# Patient Record
Sex: MALE | Race: WHITE | Employment: FULL TIME | URBAN - NONMETROPOLITAN AREA
[De-identification: names, ages, dates, MRNs, and addresses within clinical notes are randomized per-mention and may not be internally consistent; named-entity substitution may affect disease eponyms.]

---

## 2024-03-22 PROBLEM — I10 BENIGN ESSENTIAL HYPERTENSION: Status: ACTIVE | Noted: 2024-03-22

## 2024-03-22 PROBLEM — E78.5 HYPERLIPIDEMIA: Status: ACTIVE | Noted: 2024-03-22

## 2024-03-22 PROBLEM — E05.90 HYPERTHYROIDISM: Status: ACTIVE | Noted: 2024-03-22

## 2024-03-22 PROBLEM — R06.09 DYSPNEA ON EXERTION: Status: ACTIVE | Noted: 2024-03-22

## 2024-03-22 PROBLEM — E11.9 DIABETES (MULTI): Status: ACTIVE | Noted: 2024-03-22

## 2024-03-22 RX ORDER — ROSUVASTATIN CALCIUM 10 MG/1
10 TABLET, COATED ORAL EVERY OTHER DAY
COMMUNITY
Start: 2013-01-02

## 2024-03-22 RX ORDER — AMLODIPINE BESYLATE 5 MG/1
5 TABLET ORAL DAILY
COMMUNITY
Start: 2022-10-27

## 2024-03-22 RX ORDER — DICLOFENAC SODIUM 75 MG/1
75 TABLET, DELAYED RELEASE ORAL 2 TIMES DAILY
COMMUNITY
Start: 2022-10-03

## 2024-03-22 RX ORDER — IRBESARTAN AND HYDROCHLOROTHIAZIDE 300; 12.5 MG/1; MG/1
1 TABLET, FILM COATED ORAL DAILY
COMMUNITY
Start: 2022-03-19

## 2024-03-22 RX ORDER — METHIMAZOLE 10 MG/1
10 TABLET ORAL DAILY
COMMUNITY
Start: 2014-10-31

## 2024-03-22 RX ORDER — METFORMIN HYDROCHLORIDE 500 MG/1
500 TABLET ORAL EVERY 12 HOURS
COMMUNITY
Start: 2022-03-10

## 2024-03-22 RX ORDER — METOPROLOL SUCCINATE 50 MG/1
50 TABLET, EXTENDED RELEASE ORAL DAILY
COMMUNITY
Start: 2014-11-20

## 2024-09-13 ENCOUNTER — OFFICE VISIT (OUTPATIENT)
Dept: CARDIOLOGY | Facility: CLINIC | Age: 64
End: 2024-09-13
Payer: COMMERCIAL

## 2024-09-13 VITALS
SYSTOLIC BLOOD PRESSURE: 136 MMHG | WEIGHT: 176 LBS | BODY MASS INDEX: 28.28 KG/M2 | DIASTOLIC BLOOD PRESSURE: 86 MMHG | HEART RATE: 68 BPM | HEIGHT: 66 IN

## 2024-09-13 DIAGNOSIS — I10 BENIGN ESSENTIAL HYPERTENSION: ICD-10-CM

## 2024-09-13 DIAGNOSIS — R93.1 ELEVATED CORONARY ARTERY CALCIUM SCORE: ICD-10-CM

## 2024-09-13 DIAGNOSIS — E78.5 HYPERLIPIDEMIA, UNSPECIFIED HYPERLIPIDEMIA TYPE: ICD-10-CM

## 2024-09-13 PROCEDURE — 1036F TOBACCO NON-USER: CPT | Performed by: INTERNAL MEDICINE

## 2024-09-13 PROCEDURE — 3079F DIAST BP 80-89 MM HG: CPT | Performed by: INTERNAL MEDICINE

## 2024-09-13 PROCEDURE — 3075F SYST BP GE 130 - 139MM HG: CPT | Performed by: INTERNAL MEDICINE

## 2024-09-13 PROCEDURE — 99214 OFFICE O/P EST MOD 30 MIN: CPT | Performed by: INTERNAL MEDICINE

## 2024-09-13 PROCEDURE — 3008F BODY MASS INDEX DOCD: CPT | Performed by: INTERNAL MEDICINE

## 2024-09-13 RX ORDER — OXYCODONE AND ACETAMINOPHEN 10; 325 MG/1; MG/1
1 TABLET ORAL EVERY 4 HOURS PRN
COMMUNITY
Start: 2023-07-05

## 2024-09-13 RX ORDER — ASPIRIN 81 MG/1
81 TABLET ORAL DAILY
Start: 2024-09-13 | End: 2025-09-13

## 2024-09-13 NOTE — LETTER
September 13, 2024     Jaziel Owen DO  [address] [facility] [address]    Patient: Judson Jo Jr.   YOB: 1960   Date of Visit: 9/13/2024       Dear Dr. Jaziel Owen DO:    Thank you for referring Judson Jo to me for evaluation. Below are my notes for this consultation.  If you have questions, please do not hesitate to call me. I look forward to following your patient along with you.       Sincerely,     Iftikhar Jha MD      CC: No Recipients  ______________________________________________________________________________________    Subjective   Judson Jo Jr. is a 64 y.o. male       Chief Complaint    Follow-up          HPI   Patient is in the office for follow-up for the problems noted below.  Since he was last seen in the office 2 years ago he underwent treadmill stress test which came back normal.  Also had coronary calcium score which came back at 449.  The patient has made significant lifestyle changes and a positive weight and lost nearly 50 pounds and maintains very active lifestyle.  At 1 point in time he was supposed to be taking Crestor 10 mg daily but apparently that was discontinued for unknown reason maybe by his PCP.  He denies any symptoms of chest pain or palpitations and plays golf on regular basis.  He spent the winter in [place].  The findings of the coronary calcium score were reviewed with the patient.  He is currently not taking aspirin and was encouraged to take a baby aspirin daily.    Assessment/recommendations:     1-elevated coronary calcium score at 449, the patient was advised to start taking baby aspirin and we will check his lipid profile with a goal LDL cholesterol below 70 mg/dL.  2-essential hypertension, currently controlled.  Renal function is normal  3-hyperlipidemia, lipid profile is ordered since he is currently not taking medications and would likely resume rosuvastatin to bring LDL cholesterol below 70 mg/dL  4-hyperthyroidism on  "Tapazole managed by PCP  5-overweight, the patient has lost nearly 50 pounds since his last visit years ago by positive lifestyle changes which he plan on continuing..    Review of Systems   All other systems reviewed and are negative.           Vitals:    09/13/24 1156   BP: 136/86   BP Location: Left arm   Patient Position: Sitting   Pulse: 68   Weight: 79.8 kg (176 lb)   Height: 1.676 m (5' 6\")        Objective   Physical Exam  Constitutional:       Appearance: Normal appearance.   HENT:      Nose: Nose normal.   Neck:      Vascular: No carotid bruit.   Cardiovascular:      Rate and Rhythm: Normal rate.      Pulses: Normal pulses.      Heart sounds: Normal heart sounds.   Pulmonary:      Effort: Pulmonary effort is normal.   Abdominal:      General: Bowel sounds are normal.      Palpations: Abdomen is soft.   Musculoskeletal:         General: Normal range of motion.      Cervical back: Normal range of motion.      Right lower leg: No edema.      Left lower leg: No edema.   Skin:     General: Skin is warm and dry.   Neurological:      General: No focal deficit present.      Mental Status: He is alert.   Psychiatric:         Mood and Affect: Mood normal.         Behavior: Behavior normal.         Thought Content: Thought content normal.         Judgment: Judgment normal.         Allergies  Patient has no known allergies.     Current Medications    Current Outpatient Medications:   •  amLODIPine (Norvasc) 5 mg tablet, Take 0.5 tablets (2.5 mg) by mouth once daily., Disp: , Rfl:   •  diclofenac (Voltaren) 75 mg EC tablet, Take 1 tablet (75 mg) by mouth 2 times a day., Disp: , Rfl:   •  irbesartan-hydrochlorothiazide (Avalide) 300-12.5 mg tablet, Take 1 tablet by mouth once daily., Disp: , Rfl:   •  methIMAzole (Tapazole) 10 mg tablet, Take 1 tablet (10 mg) by mouth once daily., Disp: , Rfl:   •  metoprolol succinate XL (Toprol-XL) 50 mg 24 hr tablet, Take 0.5 tablets (25 mg) by mouth once daily., Disp: , Rfl:   •  " oxyCODONE-acetaminophen (Percocet)  mg tablet, 1 tablet every 4 hours if needed., Disp: , Rfl:   •  aspirin 81 mg EC tablet, Take 1 tablet (81 mg) by mouth once daily., Disp: , Rfl:                      Assessment/Plan   1. Elevated coronary artery calcium score  aspirin 81 mg EC tablet    Follow Up In Cardiology      2. Benign essential hypertension        3. Hyperlipidemia, unspecified hyperlipidemia type  Lipid Panel    Lipid Panel      4. BMI 28.0-28.9,adult                 Scribe Attestation  By signing my name below, I, Bianca COKER RN   , Scribe   attest that this documentation has been prepared under the direction and in the presence of Iftikhar Jha MD.     Provider Attestation - Scribe documentation    All medical record entries made by the Scribe were at my direction and personally dictated by me. I have reviewed the chart and agree that the record accurately reflects my personal performance of the history, physical exam, discussion and plan.

## 2024-09-13 NOTE — PATIENT INSTRUCTIONS
Please bring all medicines, vitamins, and herbal supplements with you when you come to the office.    Prescriptions will not be filled unless you are compliant with your follow up appointments or have a follow up appointment scheduled as per instruction of your physician. Refills should be requested at the time of your visit.     BMI was above normal measurement. Current weight: 79.8 kg (176 lb)  Weight change since last visit (-) denotes wt loss -45 lbs   Weight loss needed to achieve BMI 25: 21.4 Lbs  Weight loss needed to achieve BMI 30: -9.5 Lbs  Provided instructions on dietary changes  Provided instructions on exercise.    Start baby aspirin     Get labs

## 2024-09-13 NOTE — PROGRESS NOTES
"Subjective   Judson Jo Jr. is a 64 y.o. male       Chief Complaint    Follow-up          HPI   Patient is in the office for follow-up for the problems noted below.  Since he was last seen in the office 2 years ago he underwent treadmill stress test which came back normal.  Also had coronary calcium score which came back at 449.  The patient has made significant lifestyle changes and a positive weight and lost nearly 50 pounds and maintains very active lifestyle.  At 1 point in time he was supposed to be taking Crestor 10 mg daily but apparently that was discontinued for unknown reason maybe by his PCP.  He denies any symptoms of chest pain or palpitations and plays golf on regular basis.  He spent the winter in [place].  The findings of the coronary calcium score were reviewed with the patient.  He is currently not taking aspirin and was encouraged to take a baby aspirin daily.    Assessment/recommendations:     1-elevated coronary calcium score at 449, the patient was advised to start taking baby aspirin and we will check his lipid profile with a goal LDL cholesterol below 70 mg/dL.  2-essential hypertension, currently controlled.  Renal function is normal  3-hyperlipidemia, lipid profile is ordered since he is currently not taking medications and would likely resume rosuvastatin to bring LDL cholesterol below 70 mg/dL  4-hyperthyroidism on Tapazole managed by PCP  5-overweight, the patient has lost nearly 50 pounds since his last visit years ago by positive lifestyle changes which he plan on continuing..    Review of Systems   All other systems reviewed and are negative.           Vitals:    09/13/24 1156   BP: 136/86   BP Location: Left arm   Patient Position: Sitting   Pulse: 68   Weight: 79.8 kg (176 lb)   Height: 1.676 m (5' 6\")        Objective   Physical Exam  Constitutional:       Appearance: Normal appearance.   HENT:      Nose: Nose normal.   Neck:      Vascular: No carotid bruit.   Cardiovascular: "      Rate and Rhythm: Normal rate.      Pulses: Normal pulses.      Heart sounds: Normal heart sounds.   Pulmonary:      Effort: Pulmonary effort is normal.   Abdominal:      General: Bowel sounds are normal.      Palpations: Abdomen is soft.   Musculoskeletal:         General: Normal range of motion.      Cervical back: Normal range of motion.      Right lower leg: No edema.      Left lower leg: No edema.   Skin:     General: Skin is warm and dry.   Neurological:      General: No focal deficit present.      Mental Status: He is alert.   Psychiatric:         Mood and Affect: Mood normal.         Behavior: Behavior normal.         Thought Content: Thought content normal.         Judgment: Judgment normal.         Allergies  Patient has no known allergies.     Current Medications    Current Outpatient Medications:     amLODIPine (Norvasc) 5 mg tablet, Take 0.5 tablets (2.5 mg) by mouth once daily., Disp: , Rfl:     diclofenac (Voltaren) 75 mg EC tablet, Take 1 tablet (75 mg) by mouth 2 times a day., Disp: , Rfl:     irbesartan-hydrochlorothiazide (Avalide) 300-12.5 mg tablet, Take 1 tablet by mouth once daily., Disp: , Rfl:     methIMAzole (Tapazole) 10 mg tablet, Take 1 tablet (10 mg) by mouth once daily., Disp: , Rfl:     metoprolol succinate XL (Toprol-XL) 50 mg 24 hr tablet, Take 0.5 tablets (25 mg) by mouth once daily., Disp: , Rfl:     oxyCODONE-acetaminophen (Percocet)  mg tablet, 1 tablet every 4 hours if needed., Disp: , Rfl:     aspirin 81 mg EC tablet, Take 1 tablet (81 mg) by mouth once daily., Disp: , Rfl:                      Assessment/Plan   1. Elevated coronary artery calcium score  aspirin 81 mg EC tablet    Follow Up In Cardiology      2. Benign essential hypertension        3. Hyperlipidemia, unspecified hyperlipidemia type  Lipid Panel    Lipid Panel      4. BMI 28.0-28.9,adult                 Scribe Attestation  By signing my name below, Bianca BOLES RN   , Scribe   attest that this  documentation has been prepared under the direction and in the presence of Iftikhar Jha MD.     Provider Attestation - Scribe documentation    All medical record entries made by the Scribe were at my direction and personally dictated by me. I have reviewed the chart and agree that the record accurately reflects my personal performance of the history, physical exam, discussion and plan.

## 2024-11-18 ENCOUNTER — APPOINTMENT (OUTPATIENT)
Dept: CARDIOLOGY | Facility: CLINIC | Age: 64
End: 2024-11-18
Payer: COMMERCIAL

## 2025-09-16 ENCOUNTER — APPOINTMENT (OUTPATIENT)
Dept: CARDIOLOGY | Facility: CLINIC | Age: 65
End: 2025-09-16
Payer: COMMERCIAL